# Patient Record
Sex: FEMALE | Race: BLACK OR AFRICAN AMERICAN
[De-identification: names, ages, dates, MRNs, and addresses within clinical notes are randomized per-mention and may not be internally consistent; named-entity substitution may affect disease eponyms.]

---

## 2017-03-12 ENCOUNTER — HOSPITAL ENCOUNTER (EMERGENCY)
Dept: HOSPITAL 72 - EMR | Age: 67
Discharge: HOME | End: 2017-03-12
Payer: COMMERCIAL

## 2017-03-12 VITALS — SYSTOLIC BLOOD PRESSURE: 141 MMHG | DIASTOLIC BLOOD PRESSURE: 82 MMHG

## 2017-03-12 VITALS — DIASTOLIC BLOOD PRESSURE: 82 MMHG | SYSTOLIC BLOOD PRESSURE: 141 MMHG

## 2017-03-12 VITALS — BODY MASS INDEX: 32.62 KG/M2 | WEIGHT: 203 LBS | HEIGHT: 66 IN

## 2017-03-12 DIAGNOSIS — I10: ICD-10-CM

## 2017-03-12 DIAGNOSIS — E11.9: ICD-10-CM

## 2017-03-12 DIAGNOSIS — N39.0: Primary | ICD-10-CM

## 2017-03-12 LAB
APPEARANCE UR: CLEAR
BACTERIA #/AREA URNS HPF: (no result) /HPF
KETONES UR QL STRIP: NEGATIVE
LEUKOCYTE ESTERASE UR QL STRIP: (no result)
NITRITE UR QL STRIP: NEGATIVE
PH UR STRIP: 6 [PH] (ref 4.5–8)
PROT UR QL STRIP: (no result)
RBC #/AREA URNS HPF: (no result) /HPF (ref 0–2)
SP GR UR STRIP: 1.01 (ref 1–1.03)
SQUAMOUS #/AREA URNS LPF: (no result) /LPF
UROBILINOGEN UR-MCNC: NORMAL MG/DL (ref 0–1)
WBC #/AREA URNS HPF: (no result) /HPF (ref 0–2)

## 2017-03-12 PROCEDURE — 99283 EMERGENCY DEPT VISIT LOW MDM: CPT

## 2017-03-12 PROCEDURE — 81003 URINALYSIS AUTO W/O SCOPE: CPT

## 2017-03-12 NOTE — EMERGENCY ROOM REPORT
History of Present Illness


General


Chief Complaint:  Vaginal


Source:  Patient





Present Illness


HPI


The patient is a 66-year-old female presenting with one week of dysuria, 

increased urinary frequency, and vaginal irritation.  Pain is described as an 8/

10 burning which occurs only during urination.  The patient also admits to 

lower mid abdominal pain.  The patient denies flank pain denies other symptoms 

including nausea, vomiting, fever, chills, rash


Allergies:  


Coded Allergies:  


     No Known Allergies (Unverified , 3/8/15)





Patient History


Past Medical History:  see triage record


Pertinent Family History:  none


Last Menstrual Period:  na


Reviewed Nursing Documentation:  PMH: Agreed, PSxH: Agreed





Nursing Documentation-PMH


Past Medical History:  No History, Except For


Hx Cardiac Problems:  Yes - high cholesterol


Hx Hypertension:  Yes


Hx Diabetes:  Yes





Review of Systems


All Other Systems:  negative except mentioned in HPI





Physical Exam





Vital Signs








  Date Time  Temp Pulse Resp B/P Pulse Ox O2 Delivery O2 Flow Rate FiO2


 


3/12/17 14:52 97.7 98 16 141/82 96 Room Air  








Sp02 EP Interpretation:  reviewed, normal


General Appearance:  no apparent distress, alert, GCS 15, non-toxic


Head:  normocephalic, atraumatic


Eyes:  bilateral eye PERRL, bilateral eye normal inspection


Gastrointestinal:  normal bowel sounds, non tender, soft, non-distended, no 

guarding, no rebound


Genitourinary:  normal inspection, no CVA tenderness


Musculoskeletal:  back normal, gait/station normal, normal range of motion, non-

tender


Neurologic:  alert, oriented x3, responsive, motor strength/tone normal, 

sensory intact, speech normal


Psychiatric:  judgement/insight normal, memory normal, mood/affect normal, no 

suicidal/homicidal ideation


Skin:  normal color, no rash, warm/dry, well hydrated


Lymphatic:  no adenopathy





Medical Decision Making


PA Attestation


Dr. Mclaughlin is my supervising physician. Patient management was discussed with 

my supervising physician


Diagnostic Impression:  


 Primary Impression:  


 Urinary tract infection


ER Course


The patient is a 66-year-old female presenting with one week of dysuria, 

increased urinary frequency, and vaginal irritation.





Differential diagnosis considered but not limited to: UTI, vaginitis, 

pyelonephritis, pyelonephrosis, PID, ectopic pregnancy 





PE: Vitals WNL.


NAD. 


Abdomen: Normal appearance. Non distended. No ecchymosis. 


Normal BS. Non TTP. No McBurney point tenderness. No guarding. 


No CVA tenderness





Urinalysis shows few bacteria with 1+ leukocyte esterase





The patient is discharged home with a prescription for Macrobid and will 

followup with PMD.  ER precautions are given





Laboratory Tests








Test


  3/12/17


15:21


 


Urine Color Pale yellow  


 


Urine Appearance Clear  


 


Urine pH 6 (4.5-8.0)  


 


Urine Specific Gravity


  1.015


(1.005-1.035)


 


Urine Protein


  2+ (NEGATIVE)


H


 


Urine Glucose (UA)


  4+ (NEGATIVE)


H


 


Urine Ketones


  Negative


(NEGATIVE)


 


Urine Occult Blood


  1+ (NEGATIVE)


H


 


Urine Nitrite


  Negative


(NEGATIVE)


 


Urine Bilirubin


  Negative


(NEGATIVE)


 


Urine Urobilinogen


  Normal MG/DL


(0.0-1.0)


 


Urine Leukocyte Esterase


  1+ (NEGATIVE)


H


 


Urine RBC


  2-4 /HPF (0 -


2)  H


 


Urine WBC


  2-4 /HPF (0 -


2)


 


Urine Squamous Epithelial


Cells Few /LPF


(NONE/OCC)


 


Urine Bacteria


  Few /HPF


(NONE)








Lab Results Impression


1+ leukocyte esterase with few bacteria





Last Vital Signs








  Date Time  Temp Pulse Resp B/P Pulse Ox O2 Delivery O2 Flow Rate FiO2


 


3/12/17 14:52 97.7 98 16 141/82 96 Room Air  








Status:  improved


Disposition:  HOME, SELF-CARE


Condition:  Improved


Scripts


Nitrofurantoin Monohyd/M-Cryst* (MACROBID 100 MG*) 100 Mg Capsule


100 MG ORAL EVERY 12 HOURS, #14 CAP


   Prov: RONALD CHARLES         3/12/17


Patient Instructions:  Urinary Tract Infection





Additional Instructions:  


I discussed my findings with the patient. All questions and concerns have been 

answered. Treatment and medication compliance have been addressed. I advised 

the patient that they need to follow up with PMD in 3-5 days. Return to ED if 

symptoms worsen, new symptoms arise, or if needed for any reason. Patient 

verbalized understanding of discharge instructions.











RONALD CHARLES Mar 12, 2017 17:01

## 2019-01-29 ENCOUNTER — HOSPITAL ENCOUNTER (INPATIENT)
Dept: HOSPITAL 87 - ER | Age: 69
LOS: 2 days | Discharge: HOME | DRG: 308 | End: 2019-01-31
Attending: INTERNAL MEDICINE | Admitting: INTERNAL MEDICINE
Payer: COMMERCIAL

## 2019-01-29 VITALS — BODY MASS INDEX: 31.82 KG/M2 | WEIGHT: 198 LBS | HEIGHT: 66 IN

## 2019-01-29 VITALS — DIASTOLIC BLOOD PRESSURE: 74 MMHG | SYSTOLIC BLOOD PRESSURE: 139 MMHG

## 2019-01-29 DIAGNOSIS — Z79.899: ICD-10-CM

## 2019-01-29 DIAGNOSIS — Z90.710: ICD-10-CM

## 2019-01-29 DIAGNOSIS — I50.33: ICD-10-CM

## 2019-01-29 DIAGNOSIS — R07.89: ICD-10-CM

## 2019-01-29 DIAGNOSIS — I11.0: ICD-10-CM

## 2019-01-29 DIAGNOSIS — E78.5: ICD-10-CM

## 2019-01-29 DIAGNOSIS — Z82.49: ICD-10-CM

## 2019-01-29 DIAGNOSIS — I20.9: ICD-10-CM

## 2019-01-29 DIAGNOSIS — E66.9: ICD-10-CM

## 2019-01-29 DIAGNOSIS — E11.65: ICD-10-CM

## 2019-01-29 DIAGNOSIS — R06.03: ICD-10-CM

## 2019-01-29 DIAGNOSIS — I47.1: Primary | ICD-10-CM

## 2019-01-29 DIAGNOSIS — Z83.3: ICD-10-CM

## 2019-01-29 DIAGNOSIS — I49.3: ICD-10-CM

## 2019-01-29 LAB
APTT PPP: 29.6 SEC (ref 23.4–31)
BASOPHILS NFR BLD AUTO: 0.5 % (ref 0–2)
CHLORIDE SERPL-SCNC: 104 MEQ/L (ref 98–107)
EOSINOPHIL NFR BLD AUTO: 1.3 % (ref 0–5)
ERYTHROCYTE [DISTWIDTH] IN BLOOD BY AUTOMATED COUNT: 13.4 % (ref 11.6–14.6)
HCT VFR BLD AUTO: 44.9 % (ref 36–48)
HGB BLD-MCNC: 15.4 G/DL (ref 12–16)
INR PPP: 1.1
LYMPHOCYTES NFR BLD AUTO: 29.3 % (ref 20–50)
MCH RBC QN AUTO: 30.6 PG (ref 28–32)
MCV RBC AUTO: 89.1 FL (ref 81–99)
MONOCYTES NFR BLD AUTO: 6.4 % (ref 2–8)
NEUTROPHILS NFR BLD AUTO: 62.5 % (ref 40–76)
PLATELET # BLD AUTO: 246 X1000/UL (ref 130–400)
PMV BLD AUTO: 8.6 FL (ref 7.4–10.4)
PROTHROMBIN TIME: 10.8 SEC (ref 9.1–11.1)
RBC # BLD AUTO: 5.04 MILL/UL (ref 4.2–5.4)
T4 FREE SERPL-MCNC: 1.17 NG/DL (ref 0.76–1.46)

## 2019-01-29 PROCEDURE — 83036 HEMOGLOBIN GLYCOSYLATED A1C: CPT

## 2019-01-29 PROCEDURE — 36415 COLL VENOUS BLD VENIPUNCTURE: CPT

## 2019-01-29 PROCEDURE — 96374 THER/PROPH/DIAG INJ IV PUSH: CPT

## 2019-01-29 PROCEDURE — 84443 ASSAY THYROID STIM HORMONE: CPT

## 2019-01-29 PROCEDURE — 93005 ELECTROCARDIOGRAM TRACING: CPT

## 2019-01-29 PROCEDURE — 83735 ASSAY OF MAGNESIUM: CPT

## 2019-01-29 PROCEDURE — 71045 X-RAY EXAM CHEST 1 VIEW: CPT

## 2019-01-29 PROCEDURE — 80061 LIPID PANEL: CPT

## 2019-01-29 PROCEDURE — 84484 ASSAY OF TROPONIN QUANT: CPT

## 2019-01-29 PROCEDURE — 96375 TX/PRO/DX INJ NEW DRUG ADDON: CPT

## 2019-01-29 PROCEDURE — 83880 ASSAY OF NATRIURETIC PEPTIDE: CPT

## 2019-01-29 PROCEDURE — 93017 CV STRESS TEST TRACING ONLY: CPT

## 2019-01-29 PROCEDURE — 99285 EMERGENCY DEPT VISIT HI MDM: CPT

## 2019-01-29 PROCEDURE — 80048 BASIC METABOLIC PNL TOTAL CA: CPT

## 2019-01-29 PROCEDURE — 82962 GLUCOSE BLOOD TEST: CPT

## 2019-01-29 PROCEDURE — 78452 HT MUSCLE IMAGE SPECT MULT: CPT

## 2019-01-29 PROCEDURE — 93306 TTE W/DOPPLER COMPLETE: CPT

## 2019-01-29 PROCEDURE — 84439 ASSAY OF FREE THYROXINE: CPT

## 2019-01-30 VITALS — DIASTOLIC BLOOD PRESSURE: 84 MMHG | SYSTOLIC BLOOD PRESSURE: 150 MMHG

## 2019-01-30 VITALS — SYSTOLIC BLOOD PRESSURE: 122 MMHG | DIASTOLIC BLOOD PRESSURE: 64 MMHG

## 2019-01-30 VITALS — DIASTOLIC BLOOD PRESSURE: 80 MMHG | SYSTOLIC BLOOD PRESSURE: 150 MMHG

## 2019-01-30 VITALS — SYSTOLIC BLOOD PRESSURE: 116 MMHG | DIASTOLIC BLOOD PRESSURE: 57 MMHG

## 2019-01-30 VITALS — DIASTOLIC BLOOD PRESSURE: 77 MMHG | SYSTOLIC BLOOD PRESSURE: 133 MMHG

## 2019-01-30 VITALS — SYSTOLIC BLOOD PRESSURE: 120 MMHG | DIASTOLIC BLOOD PRESSURE: 62 MMHG

## 2019-01-30 VITALS — DIASTOLIC BLOOD PRESSURE: 74 MMHG | SYSTOLIC BLOOD PRESSURE: 139 MMHG

## 2019-01-30 LAB
BASOPHILS NFR BLD AUTO: 0.5 % (ref 0–2)
CHLORIDE SERPL-SCNC: 107 MEQ/L (ref 98–107)
EOSINOPHIL NFR BLD AUTO: 1.1 % (ref 0–5)
ERYTHROCYTE [DISTWIDTH] IN BLOOD BY AUTOMATED COUNT: 13.6 % (ref 11.6–14.6)
HCT VFR BLD AUTO: 41 % (ref 36–48)
HDLC SERPL-MCNC: 44 MG/DL (ref 40–59)
HGB BLD-MCNC: 14 G/DL (ref 12–16)
LDLC SERPL DIRECT ASSAY-MCNC: 69 MG/DL (ref 5–100)
LYMPHOCYTES NFR BLD AUTO: 29.6 % (ref 20–50)
MCH RBC QN AUTO: 30.7 PG (ref 28–32)
MCV RBC AUTO: 89.7 FL (ref 81–99)
MONOCYTES NFR BLD AUTO: 10.1 % (ref 2–8)
NEUTROPHILS NFR BLD AUTO: 58.7 % (ref 40–76)
PLATELET # BLD AUTO: 230 X1000/UL (ref 130–400)
PMV BLD AUTO: 8.5 FL (ref 7.4–10.4)
RBC # BLD AUTO: 4.57 MILL/UL (ref 4.2–5.4)

## 2019-01-30 RX ADMIN — METOPROLOL TARTRATE SCH MG: 50 TABLET, FILM COATED ORAL at 20:49

## 2019-01-30 RX ADMIN — AMLODIPINE BESYLATE SCH MG: 5 TABLET ORAL at 20:51

## 2019-01-30 RX ADMIN — INSULIN LISPRO SCH UNIT: 100 INJECTION, SOLUTION INTRAVENOUS; SUBCUTANEOUS at 18:09

## 2019-01-30 RX ADMIN — Medication SCH STRIP: at 11:59

## 2019-01-30 RX ADMIN — Medication SCH STRIP: at 17:49

## 2019-01-30 RX ADMIN — ENOXAPARIN SODIUM SCH MG: 100 INJECTION SUBCUTANEOUS at 09:51

## 2019-01-30 RX ADMIN — INSULIN LISPRO SCH UNIT: 100 INJECTION, SOLUTION INTRAVENOUS; SUBCUTANEOUS at 13:06

## 2019-01-30 RX ADMIN — METOPROLOL TARTRATE SCH MG: 50 TABLET, FILM COATED ORAL at 09:53

## 2019-01-30 RX ADMIN — Medication SCH STRIP: at 07:01

## 2019-01-30 RX ADMIN — INSULIN LISPRO SCH UNIT: 100 INJECTION, SOLUTION INTRAVENOUS; SUBCUTANEOUS at 20:51

## 2019-01-30 RX ADMIN — Medication SCH STRIP: at 20:39

## 2019-01-30 RX ADMIN — ASPIRIN SCH MG: 81 TABLET, COATED ORAL at 09:52

## 2019-01-30 RX ADMIN — LOSARTAN POTASSIUM SCH MG: 100 TABLET ORAL at 09:51

## 2019-01-31 VITALS — SYSTOLIC BLOOD PRESSURE: 145 MMHG | DIASTOLIC BLOOD PRESSURE: 78 MMHG

## 2019-01-31 VITALS — DIASTOLIC BLOOD PRESSURE: 72 MMHG | SYSTOLIC BLOOD PRESSURE: 132 MMHG

## 2019-01-31 VITALS — SYSTOLIC BLOOD PRESSURE: 136 MMHG | DIASTOLIC BLOOD PRESSURE: 79 MMHG

## 2019-01-31 VITALS — SYSTOLIC BLOOD PRESSURE: 141 MMHG | DIASTOLIC BLOOD PRESSURE: 78 MMHG

## 2019-01-31 VITALS — SYSTOLIC BLOOD PRESSURE: 132 MMHG | DIASTOLIC BLOOD PRESSURE: 71 MMHG

## 2019-01-31 VITALS — DIASTOLIC BLOOD PRESSURE: 53 MMHG | SYSTOLIC BLOOD PRESSURE: 136 MMHG

## 2019-01-31 LAB
BASOPHILS NFR BLD AUTO: 0.4 % (ref 0–2)
CHLORIDE SERPL-SCNC: 106 MEQ/L (ref 98–107)
EOSINOPHIL NFR BLD AUTO: 1.8 % (ref 0–5)
ERYTHROCYTE [DISTWIDTH] IN BLOOD BY AUTOMATED COUNT: 13.9 % (ref 11.6–14.6)
HCT VFR BLD AUTO: 42.2 % (ref 36–48)
HGB BLD-MCNC: 14.4 G/DL (ref 12–16)
LYMPHOCYTES NFR BLD AUTO: 38.7 % (ref 20–50)
MCH RBC QN AUTO: 30.4 PG (ref 28–32)
MCV RBC AUTO: 88.8 FL (ref 81–99)
MONOCYTES NFR BLD AUTO: 9.5 % (ref 2–8)
NEUTROPHILS NFR BLD AUTO: 49.6 % (ref 40–76)
PLATELET # BLD AUTO: 239 X1000/UL (ref 130–400)
PMV BLD AUTO: 8.6 FL (ref 7.4–10.4)
RBC # BLD AUTO: 4.75 MILL/UL (ref 4.2–5.4)

## 2019-01-31 RX ADMIN — INSULIN LISPRO SCH UNIT: 100 INJECTION, SOLUTION INTRAVENOUS; SUBCUTANEOUS at 08:10

## 2019-01-31 RX ADMIN — Medication SCH STRIP: at 13:35

## 2019-01-31 RX ADMIN — ENOXAPARIN SODIUM SCH MG: 100 INJECTION SUBCUTANEOUS at 09:00

## 2019-01-31 RX ADMIN — INSULIN LISPRO SCH UNIT: 100 INJECTION, SOLUTION INTRAVENOUS; SUBCUTANEOUS at 13:35

## 2019-01-31 RX ADMIN — LOSARTAN POTASSIUM SCH MG: 100 TABLET ORAL at 09:00

## 2019-01-31 RX ADMIN — METOPROLOL TARTRATE SCH MG: 50 TABLET, FILM COATED ORAL at 09:00

## 2019-01-31 RX ADMIN — ASPIRIN SCH MG: 81 TABLET, COATED ORAL at 09:00

## 2019-01-31 RX ADMIN — Medication SCH STRIP: at 07:40

## 2019-01-31 RX ADMIN — AMLODIPINE BESYLATE SCH MG: 5 TABLET ORAL at 09:00

## 2019-03-18 ENCOUNTER — HOSPITAL ENCOUNTER (EMERGENCY)
Dept: HOSPITAL 72 - EMR | Age: 69
Discharge: HOME | End: 2019-03-18
Payer: MEDICARE

## 2019-03-18 VITALS — DIASTOLIC BLOOD PRESSURE: 91 MMHG | SYSTOLIC BLOOD PRESSURE: 159 MMHG

## 2019-03-18 VITALS — SYSTOLIC BLOOD PRESSURE: 159 MMHG | DIASTOLIC BLOOD PRESSURE: 91 MMHG

## 2019-03-18 VITALS — BODY MASS INDEX: 31.18 KG/M2 | HEIGHT: 66 IN | WEIGHT: 194 LBS

## 2019-03-18 DIAGNOSIS — E78.00: ICD-10-CM

## 2019-03-18 DIAGNOSIS — M54.5: Primary | ICD-10-CM

## 2019-03-18 DIAGNOSIS — E11.9: ICD-10-CM

## 2019-03-18 DIAGNOSIS — I10: ICD-10-CM

## 2019-03-18 PROCEDURE — 72131 CT LUMBAR SPINE W/O DYE: CPT

## 2019-03-18 PROCEDURE — 99284 EMERGENCY DEPT VISIT MOD MDM: CPT

## 2019-03-18 NOTE — NUR
ED Nurse Note:



Pt walked in from home, bended down to  object on Friday 3/15/19 and has 
lower back pain since then. Pain 9/10 silvano. /80 upon arrival. AOx4, other 
VSS. will cont to monitor.

## 2019-03-18 NOTE — NUR
ER DISCHARGE NOTE:

Patient is cleared to be discharged per ERMD, pt is aox4, on room air, with 
stable vital signs. pt was given dc and prescription instructions, pt was able 
to verbalize understanding, pt id band  removed without complications. pt is 
able to ambulate with steady gait. pt took all belongings.

## 2019-03-18 NOTE — EMERGENCY ROOM REPORT
History of Present Illness


General


Chief Complaint:  Back Pain-No Injury


Source:  Patient





Present Illness


HPI


68-year-old female patient presents the ER complaining of left-sided lower back 

pain times a few days.  Reports that she bent over to  something and 

when she stood up her back began to hurt.  Reports pain in that side of her 

back.  Denies tenderness palpation however reports pain worse with movement.  

Denies bowel or bladder incontinence.  Denies history of back problems.  Denies 

pain radiating down her legs.  Denies history of cancer drug use.  Denies fever

, chest pain, shortness of breath.  Denies abdominal pain.  Denies other 

aggravating or relieving factors pain. denies dysuria, hematuria.


Allergies:  


Coded Allergies:  


     No Known Allergies (Unverified , 3/8/15)





Patient History


Past Medical History:  see triage record


Pregnant Now:  No


Reviewed Nursing Documentation:  PMH: Agreed; PSxH: Agreed





Nursing Documentation-PMH


Past Medical History:  No History, Except For


Hx Cardiac Problems:  Yes - high cholesterol


Hx Hypertension:  Yes


Hx Diabetes:  Yes





Review of Systems


All Other Systems:  negative except mentioned in HPI





Physical Exam





Vital Signs








  Date Time  Temp Pulse Resp B/P (MAP) Pulse Ox O2 Delivery O2 Flow Rate FiO2


 


3/18/19 13:19 97.9 80 21 179/90 96 Room Air  








Sp02 EP Interpretation:  reviewed, normal


General Appearance:  well appearing, no apparent distress, alert, GCS 15, non-

toxic


Head:  normocephalic, atraumatic


Eyes:  bilateral eye normal inspection, bilateral eye PERRL


ENT:  hearing grossly normal, normal pharynx, no angioedema, normal voice, 

uvula midline, moist mucus membranes


Neck:  full range of motion, no meningismus, no bony tend


Respiratory:  lungs clear, normal breath sounds, no rhonchi, no respiratory 

distress, no accessory muscle use, no wheezing, speaking full sentences


Cardiovascular #1:  regular rate, rhythm, no edema


Gastrointestinal:  non tender, soft, no mass, non-distended, no guarding, no 

rebound


Genitourinary:  no CVA tenderness


Musculoskeletal:  back normal, digits/nails normal, gait/station normal, normal 

range of motion, non-tender


Neurologic:  alert, oriented x3, responsive, motor strength/tone normal, SLR 

negative, sensory intact, cerebellar normal, normal gait, speech normal


Psychiatric:  mood/affect normal


Skin:  no rash





Medical Decision Making


PA Attestation


Dr. Brownlee is my supervising Physician whom patient management has been 

discussed with.


Diagnostic Impression:  


 Primary Impression:  


 Back pain


ER Course


Pt presents to ED c/o back pain.





DDX considered but are not limited to sprain, strain, cauda equine, epidural 

abscess, AAA, spinal cord compression, kidney stones.





Low suspicion for cauda equina, no bowel or bladder incontinence or retention.


No fever, nontoxic appearing, no radiation of pain, low suspicion for epidural 

mass.


No abdominal pain, no blood pressure elevation, nontoxic appearing, low 

suspicion for AAA.





VITAL SIGNS are WNL, patient is afebrile





Ordered pain medication, imaging, labs.





ER COURSE:


Pain medication provided.





CT lumbar spine shows No acute bony trauma and Degenerative changes.


Discuss results with the patient.  


Provided patient with copy of results. 


Instructed patient to followup with PCP and discuss results of report with 

patient, discuss need for further treatment and referral.





Followup with pain management and/or PT. Request referral from PCP.


Followup with PCP for further MRI  as needed.


Patient reports feeling better.  Able to ambulate independently.  Okay for 

outpatient follow-up and treatment.





DISCHARGE: 


-Rx provided for Motrin


-Rx provided for Lidocaine patch


-Rx provided for Robaxin. SE may cause drowsiness, do not take prior to drinking

, driving, or operating [employer] machinery.





At this time pt. is stable for d/c to home.  At this time patient is resting 

comfortably, in no acute distress, nontoxic appearing, smiling and talking 

without difficulty.


Will provide printed patient care instructions, and any necessary prescriptions.


Patient instructed to follow with primary care provider for further treatment 

and referral as needed.


Care plan and follow up instructions have been discussed with the patient prior 

to discharge.


Patient reports understanding and agreement to treatment plan.


Patient questions asked and answered.


ER precautions given, patient instructed to return to ER immediately for any 

new or worsening of symptoms.





- Please note that this Emergency Department Report was dictated using EpicPledge technology software, occasionally this can lead to 

erroneous entry secondary to interpretation by the dictation equipment.


CT/MRI/US Diagnostic Results


CT/MRI/US Diagnostic Results :  


   Imaging Test Ordered:  CT lumbar spine


   Impression


Impression: No acute bony trauma


 


Degenerative changes, as detailed in radiology report.





Last Vital Signs








  Date Time  Temp Pulse Resp B/P (MAP) Pulse Ox O2 Delivery O2 Flow Rate FiO2


 


3/18/19 13:19 97.9 80 21 179/90 96 Room Air  








Status:  improved


Disposition:  HOME, SELF-CARE


Condition:  Stable


Scripts


Ibuprofen* (MOTRIN*) 600 Mg Tablet


600 MG ORAL Q8H PRN for For Pain, #30 TAB 0 Refills


   Prov: Yakov Jimenez         3/18/19 


Lidocaine (Lidocaine) 1 Each Adh..patch


5 % TP DAILY for 7 Days, #7 PATCH


   Prov: Yakov Jimenez         3/18/19 


Baclofen* (BACLOFEN*) 10 Mg Tablet


10 MG ORAL THREE TIMES A DAY, #20 TAB


   Prov: Yakov Jimenez         3/18/19


Patient Instructions:  Back Pain, Adult, Degenerative Disk Disease, Muscle 

Strain, Easy-to-Read





Additional Instructions:  


Patient instructed to follow up with primary care provider and discuss further 

referral to orthopedics/physical therapy/pain management as needed. 


If unable to followup with PCP, followup with orthopedic urgent care in 5-7 days

, call to schedule appointment.


Patient instructed on RICE method: rest, ice, compression, elevation.


Patient instructed to WBAT.


Take medications as directed. 


Patient questions asked and answered.


ER precautions given, patient instructed to return to ER immediately for any 

new or worsening of symptoms.








[facility]


[id number]


[place], [address]


[phone]


[email or web address]











Yakov Jimenez Mar 18, 2019 13:44

## 2019-03-18 NOTE — DIAGNOSTIC IMAGING REPORT
Indications: Left-sided lower back pain for a few days

 

Technique: Spiral acquisitions obtained through the lumbar spine. Multiplanar

reconstructions were generated. No IV contrast utilized. Total dose length product

654.53 mGycm. CTDIvol(s) 23.21 mGy.  Dose reduction achieved using automated exposure

control

 

 

Comparison: none  

 

Findings: There is transitional anatomy with T12 demonstrating vestigial ribs. The

bony alignment is normal. Vertebral body heights are preserved. The disc spaces are

preserved. No acute fractures. No dislocations.

 

At T11-12, there is mild vacuum disc formation. No significant disc bulge or

protrusion, disc narrowing, spinal stenosis, or neural foraminal stenosis.

 

At L1-2, there is mild circumferential annular bulge. This results in equivocal

borderline narrowing of the spinal canal. No significant neural foraminal stenosis.

 

At L2-3, there is vacuum formation. The disc spaces preserved. There is suggestion of

left subarticular and some foraminal disc protrusion as well as mild circumferential

annular bulge. In combination with ligamentum flavum and facet hypertrophy, this

results in mild narrowing of the spinal canal, possible compromise of the left

lateral recess and neural foramen. The right neural foramen is preserved. There is

mild bilateral facet arthrosis at this level.

 

At L3-4, there is circumferential annular bulge. This, in combination with ligamentum

flavum hypertrophy, results in mild narrowing of the spinal canal. There is also some

left subarticular disc protrusion, which may impinge slightly upon the left lateral

recess. This does not significantly compromise the neural foramen. There is bilateral

facet arthrosis. The disc space is preserved.

 

At the remaining disc levels, no significant disc bulge or protrusion, spinal

stenosis, or neural foraminal stenosis.

 

There is facet arthrosis of the bilateral L3-4, L4-5, and L5-S1 facets, most striking

at L3-4

 

The included extraspinal soft tissues are unremarkable

 

Impression: No acute bony trauma

 

Degenerative changes, as detailed on a level by level basis above

 

 

 

The CT scanner at [facility] is accredited by the American College of

Radiology and the scans are performed using protocols designed to limit radiation

exposure to as low as reasonably achievable to attain images of sufficient resolution

adequate for diagnostic evaluation.

## 2019-05-14 ENCOUNTER — HOSPITAL ENCOUNTER (EMERGENCY)
Dept: HOSPITAL 87 - ER | Age: 69
Discharge: ANOTHER HOSPITAL | End: 2019-05-14
Payer: COMMERCIAL

## 2019-05-14 VITALS — SYSTOLIC BLOOD PRESSURE: 178 MMHG | DIASTOLIC BLOOD PRESSURE: 97 MMHG

## 2019-05-14 VITALS — BODY MASS INDEX: 31.53 KG/M2 | HEIGHT: 66 IN | WEIGHT: 196.21 LBS

## 2019-05-14 DIAGNOSIS — R07.89: Primary | ICD-10-CM

## 2019-05-14 DIAGNOSIS — E11.9: ICD-10-CM

## 2019-05-14 DIAGNOSIS — I10: ICD-10-CM

## 2019-05-14 DIAGNOSIS — Z79.82: ICD-10-CM

## 2019-05-14 DIAGNOSIS — Z79.899: ICD-10-CM

## 2019-05-14 DIAGNOSIS — Z98.890: ICD-10-CM

## 2019-05-14 DIAGNOSIS — Z90.710: ICD-10-CM

## 2019-05-14 DIAGNOSIS — Z79.4: ICD-10-CM

## 2019-05-14 DIAGNOSIS — E78.00: ICD-10-CM

## 2019-05-14 DIAGNOSIS — R42: ICD-10-CM

## 2019-05-14 LAB
APPEARANCE UR: CLEAR
BASOPHILS NFR BLD AUTO: 1 % (ref 0–2)
CHLORIDE SERPL-SCNC: 102 MEQ/L (ref 98–107)
COLOR UR: YELLOW
EOSINOPHIL NFR BLD AUTO: 1.4 % (ref 0–5)
ERYTHROCYTE [DISTWIDTH] IN BLOOD BY AUTOMATED COUNT: 13.4 % (ref 11.6–14.6)
HCT VFR BLD AUTO: 46.4 % (ref 36–48)
HGB BLD-MCNC: 16 G/DL (ref 12–16)
HGB UR QL STRIP: NEGATIVE
INR PPP: 1
KETONES UR STRIP-MCNC: (no result) MG/DL
LEUKOCYTE ESTERASE UR QL STRIP: NEGATIVE
LYMPHOCYTES NFR BLD AUTO: 26 % (ref 20–50)
MCH RBC QN AUTO: 30.3 PG (ref 28–32)
MCV RBC AUTO: 88 FL (ref 81–99)
MONOCYTES NFR BLD AUTO: 7 % (ref 2–8)
NEUTROPHILS NFR BLD AUTO: 64.6 % (ref 40–76)
NITRITE UR QL STRIP: NEGATIVE
PH UR STRIP: 6.5 [PH] (ref 4.5–8)
PLATELET # BLD AUTO: 236 X1000/UL (ref 130–400)
PMV BLD AUTO: 9 FL (ref 7.4–10.4)
PROT UR QL STRIP: (no result)
PROTHROMBIN TIME: 10.5 SEC (ref 9.6–11)
RBC # BLD AUTO: 5.27 MILL/UL (ref 4.2–5.4)
SP GR UR STRIP: 1.03 (ref 1–1.03)
UROBILINOGEN UR STRIP-MCNC: 0.2 E.U./DL (ref 0.2–1)

## 2019-05-14 PROCEDURE — 93005 ELECTROCARDIOGRAM TRACING: CPT

## 2019-05-14 PROCEDURE — 99285 EMERGENCY DEPT VISIT HI MDM: CPT

## 2019-05-14 PROCEDURE — 81003 URINALYSIS AUTO W/O SCOPE: CPT

## 2019-05-14 PROCEDURE — 80053 COMPREHEN METABOLIC PANEL: CPT

## 2019-05-14 PROCEDURE — 85610 PROTHROMBIN TIME: CPT

## 2019-05-14 PROCEDURE — 70450 CT HEAD/BRAIN W/O DYE: CPT

## 2019-05-14 PROCEDURE — 84484 ASSAY OF TROPONIN QUANT: CPT

## 2019-05-14 PROCEDURE — 71045 X-RAY EXAM CHEST 1 VIEW: CPT

## 2019-05-14 PROCEDURE — 82962 GLUCOSE BLOOD TEST: CPT

## 2019-05-14 PROCEDURE — 36415 COLL VENOUS BLD VENIPUNCTURE: CPT

## 2019-05-14 PROCEDURE — 96372 THER/PROPH/DIAG INJ SC/IM: CPT

## 2019-05-14 PROCEDURE — 83036 HEMOGLOBIN GLYCOSYLATED A1C: CPT

## 2019-05-14 PROCEDURE — 83880 ASSAY OF NATRIURETIC PEPTIDE: CPT

## 2019-05-14 PROCEDURE — 85025 COMPLETE CBC W/AUTO DIFF WBC: CPT

## 2019-12-01 ENCOUNTER — HOSPITAL ENCOUNTER (EMERGENCY)
Dept: HOSPITAL 72 - EMR | Age: 69
Discharge: HOME | End: 2019-12-01
Payer: MEDICARE

## 2019-12-01 VITALS — WEIGHT: 182 LBS | HEIGHT: 66 IN | BODY MASS INDEX: 29.25 KG/M2

## 2019-12-01 VITALS — SYSTOLIC BLOOD PRESSURE: 150 MMHG | DIASTOLIC BLOOD PRESSURE: 86 MMHG

## 2019-12-01 VITALS — SYSTOLIC BLOOD PRESSURE: 172 MMHG | DIASTOLIC BLOOD PRESSURE: 105 MMHG

## 2019-12-01 DIAGNOSIS — E11.9: ICD-10-CM

## 2019-12-01 DIAGNOSIS — I10: ICD-10-CM

## 2019-12-01 DIAGNOSIS — E78.00: ICD-10-CM

## 2019-12-01 DIAGNOSIS — N95.2: Primary | ICD-10-CM

## 2019-12-01 DIAGNOSIS — B37.9: ICD-10-CM

## 2019-12-01 PROCEDURE — 99283 EMERGENCY DEPT VISIT LOW MDM: CPT

## 2019-12-01 NOTE — NUR
ER DISCHARGE NOTE:

Patient is cleared to be discharged per ERMD Dr. Rodriguez, pt is aox4, on room 
air, with stable vital signs. pt was given dc and prescription instructions, pt 
was able to verbalize understanding, pt id band  removed without complications. 
pt is able to ambulate with steady gait. pt took all belongings.

## 2019-12-01 NOTE — EMERGENCY ROOM REPORT
History of Present Illness


General


Chief Complaint:  Female Urogenital Problems


Source:  Patient





Present Illness


HPI


69-year-old female presents with whitish discharge from the vagina, and 

irritation and itchiness, severity is mild no aggravating relieving factors  

patient denies any fever/chills, she has a history of a hysterectomy in the past

, symptoms have been ongoing for almost a year patient presents for eval


Allergies:  


Coded Allergies:  


     No Known Allergies (Unverified , 3/8/15)





Patient History


Past Medical History:  see triage record


Reviewed Nursing Documentation:  PMH: Agreed; PSxH: Agreed





Nursing Documentation-PMH


Past Medical History:  No History, Except For


Hx Cardiac Problems:  Yes - high cholesterol


Hx Hypertension:  Yes


Hx Diabetes:  Yes





Review of Systems


All Other Systems:  negative except mentioned in HPI





Physical Exam





Vital Signs








  Date Time  Temp Pulse Resp B/P (MAP) Pulse Ox O2 Delivery O2 Flow Rate FiO2


 


12/1/19 09:12 98.4 76 17 172/105 (127) 99 Room Air  








General Appearance:  well appearing, no apparent distress


Head:  normocephalic, atraumatic


ENT:  hearing grossly normal, normal voice


Neck:  full range of motion, supple


Respiratory:  no respiratory distress, speaking full sentences


Gastrointestinal:  non tender, soft


Rectal:  other - Chaperone Vivek Ingram RN, atrophy of the vulva, with 

white discharge


Genitourinary:  no CVA tenderness


Neurologic:  alert, normal gait


Psychiatric:  mood/affect normal


Skin:  no rash





Medical Decision Making


Diagnostic Impression:  


 Primary Impression:  


 Atrophic vaginitis


 Additional Impression:  


 Yeast infection


ER Course


69-year-old female presents with atrophic vaginitis as well as a yeast infection

, patient has not been sexually active for years, patient additionally has a 

hysterectomy low suspicion for endometrial carcinoma, patient bleeding is from 

the atrophic vulva which is friable disposition home with return precautions





Last Vital Signs








  Date Time  Temp Pulse Resp B/P (MAP) Pulse Ox O2 Delivery O2 Flow Rate FiO2


 


12/1/19 09:12 98.4 76 17 172/105 (127) 99 Room Air  








Disposition:  HOME, SELF-CARE


Condition:  Stable


Scripts


Fluconazole (FLUCONAZOLE) 150 Mg Tablet


150 MG ORAL DAILY PRN for please take if recurrent yeast, #1 TAB 0 Refills


   Prov: Jose Rodriguez MD         12/1/19


Referrals:  


NON PHYSICIAN (PCP)











[facility]





[facility]





[facility]


Patient Instructions:  Atrophic Vaginitis, Easy-to-Read, Vaginal Yeast Infection

, Adult





Additional Instructions:  


The patient was provided with discharge instructions, notified to follow-up 

with a primary care doctor and or specialist in the next 24-48 hours, and to 

return to the ED if they have worsening of their symptoms. 





Please note that this report is being documented using DRAGON technology.


This can lead to erroneous entry secondary to incorrect interpretation by the 

dictating instrument. 





FOLLOW-UP WITH GYN WHO CAN PRESCRIBE ESTROGEN CREAMS











Jose Rodriguez MD Dec 1, 2019 10:00

## 2019-12-01 NOTE — NUR
ED Nurse Note:

Patient ambulated into ER from home with c/o vaginal spotting, itching, and 
irritation. Pain score of 6/10. Patient states a hx of  Patient states when she 
went to the bathroom no vaginal bleeding was present. patient is AAOx4, on room 
air, and no signs of respiratory or cardiovascular distress noted.